# Patient Record
Sex: MALE | Race: WHITE | Employment: OTHER | URBAN - METROPOLITAN AREA
[De-identification: names, ages, dates, MRNs, and addresses within clinical notes are randomized per-mention and may not be internally consistent; named-entity substitution may affect disease eponyms.]

---

## 2020-02-07 ENCOUNTER — HOSPITAL ENCOUNTER (OUTPATIENT)
Dept: CT IMAGING | Age: 65
Discharge: HOME | End: 2020-02-07
Payer: SELF-PAY

## 2020-02-07 DIAGNOSIS — Z00.00 PREVENTATIVE HEALTH CARE: ICD-10-CM

## 2020-02-07 PROCEDURE — 75571 CT HRT W/O DYE W/CA TEST: CPT

## 2020-02-11 NOTE — CARDIO/PULMONARY
Reached patient at his given mobile number. He states his PCP contacted him yesterday about his elevated coronary artery calcium score. Patient states he is already scheduled to follow up with Dr. Destiny Dee. We discussed the meaning of this score. Patient has no further questions at this time.

## 2020-02-17 ENCOUNTER — OFFICE VISIT (OUTPATIENT)
Dept: CARDIOLOGY CLINIC | Age: 65
End: 2020-02-17

## 2020-02-17 VITALS
DIASTOLIC BLOOD PRESSURE: 80 MMHG | RESPIRATION RATE: 19 BRPM | HEIGHT: 68 IN | SYSTOLIC BLOOD PRESSURE: 138 MMHG | OXYGEN SATURATION: 96 % | HEART RATE: 77 BPM | BODY MASS INDEX: 36.31 KG/M2 | WEIGHT: 239.6 LBS

## 2020-02-17 DIAGNOSIS — R93.1 ELEVATED CORONARY ARTERY CALCIUM SCORE: Primary | ICD-10-CM

## 2020-02-17 DIAGNOSIS — Z82.49 FAMILY HISTORY OF EARLY CAD: ICD-10-CM

## 2020-02-17 DIAGNOSIS — E66.01 SEVERE OBESITY (HCC): ICD-10-CM

## 2020-02-17 DIAGNOSIS — I10 BENIGN ESSENTIAL HTN: ICD-10-CM

## 2020-02-17 RX ORDER — ATORVASTATIN CALCIUM 10 MG/1
TABLET, FILM COATED ORAL
COMMUNITY
Start: 2020-02-10 | End: 2020-02-17

## 2020-02-17 RX ORDER — LISINOPRIL 20 MG/1
TABLET ORAL
COMMUNITY
Start: 2020-01-28

## 2020-02-17 RX ORDER — GUAIFENESIN 100 MG/5ML
81 LIQUID (ML) ORAL DAILY
Qty: 90 TAB | Refills: 1 | Status: SHIPPED | OUTPATIENT
Start: 2020-02-17

## 2020-02-17 RX ORDER — ATORVASTATIN CALCIUM 40 MG/1
40 TABLET, FILM COATED ORAL DAILY
Qty: 90 TAB | Refills: 1 | Status: SHIPPED | OUTPATIENT
Start: 2020-02-17 | End: 2020-08-17

## 2020-02-17 NOTE — PROGRESS NOTES
[facility] [address]: [place]  [phone]    History of Present Illness:  Mr. Noble Mcclure is a 74 yo M with essential hypertension, family history of early coronary artery disease, referred by Dr. Concepcion Belle for cardiac evaluation. He is here due to elevated coronary calcium score of 616 (with 416 in the LAD, 80 in the right coronary artery and 120 in the ramus) putting him in the 70th percentile for his age. From a cardiac standpoint, he denies any prior cardiac history, but his younger brother passed a month ago so he ended up getting this screening test.  From a symptom standpoint, he denies any exertional chest pain, shortness of breath, palpitations, lightheadedness or dizziness. He does admit he does not exercise regular, but does do some lifting from time to time. 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 He is compensated on exam with clear lungs and no lower extremity edema. His blood pressure is 138/80 with a heart rate of 77. We reviewed his coronary calcium score results. Soc hx. Retired, construction. Fam hx. Younger brother with CAD/passed. Assessment and Plan:    1. Elevated coronary calcium score. Elevated score and discussed results. He is not active and will proceed with a treadmill nuclear stress test for further evaluation. Agree with statin. With his score, recommended at least moderate intensity statin and will increase him to Lipitor 40 mg once daily. Also, I think the benefits outweigh the risks in terms of taking aspirin 81 mg once a day. If his stress test is normal, he will work on regular exercise and would have him follow back in one year. 2. Essential hypertension. Blood pressure is controlled. 3. Family history of early coronary artery disease. He  has a past medical history of Essential hypertension and Hyperlipidemia. All other systems negative except as above.      PE  Vitals:    02/17/20 1455   BP: 138/80   Pulse: 77 Resp: 19   SpO2: 96%   Weight: 239 lb 9.6 oz (108.7 kg)   Height: 5' 8\" (1.727 m)    Body mass index is 36.43 kg/m². General appearance - alert, well appearing, and in no distress  Mental status - affect appropriate to mood  Eyes - sclera anicteric, moist mucous membranes  Neck - supple, no JVD  Chest - clear to auscultation, no wheezes, rales or rhonchi  Heart - normal rate, regular rhythm, normal S1, S2, no murmurs, rubs, clicks or gallops  Abdomen - soft, nontender, nondistended, no masses or organomegaly  Neurological - no focal deficit  Extremities - peripheral pulses normal, no pedal edema      Recent Labs:  No results found for: CHOL, CHOLX, CHLST, CHOLV, 499747, HDL, HDLP, LDL, LDLC, DLDLP, TGLX, TRIGL, TRIGP, CHHD, CHHDX  No results found for: VAIBHAV, CREAPOC, [facility], CREA, REFC3, REFC4  No results found for: BUN, BUNPOC, IBUN, MBUNV, BUNV  No results found for: K, KI, PLK, WBK  No results found for: HBA1C, HGBE8, MCX2UGTT, TJA6FCZL  No results found for: HGBPOC, HGB, HGBP, HGBEXT, HGBEXT  No results found for: PLT, PLTEXT, PLTEXT    Reviewed:  Past Medical History:   Diagnosis Date    Essential hypertension     Hyperlipidemia      Social History     Tobacco Use   Smoking Status Never Smoker   Smokeless Tobacco Never Used     Social History     Substance and Sexual Activity   Alcohol Use Yes    Comment: casual     Not on File    Current Outpatient Medications   Medication Sig    atorvastatin (LIPITOR) 10 mg tablet     lisinopril (PRINIVIL, ZESTRIL) 20 mg tablet      No current facility-administered medications for this visit.         Laz Duvall MD  [facility] [place]  [facility], [address] [facility] [address]  [address]

## 2020-02-17 NOTE — LETTER
2/18/2020 9:34 AM 
 
Patient:  Aury Smith YOB: 1955 Date of Visit: 2/17/2020 Dear Una Glez, [address] [id number] VIA Facsimile: [phone] 
 : Thank you for referring Mr. Aury Smith to me for evaluation/treatment. Below are the relevant portions of my assessment and plan of care. Mr. Ekta Phillips is a 74 yo M with essential hypertension, family history of early coronary artery disease, referred by Dr. Domingo Selby for cardiac evaluation. He is here due to elevated coronary calcium score of 616 (with 416 in the LAD, 80 in the right coronary artery and 120 in the ramus) putting him in the 70th percentile for his age. From a cardiac standpoint, he denies any prior cardiac history, but his younger brother passed a month ago so he ended up getting this screening test.  From a symptom standpoint, he denies any exertional chest pain, shortness of breath, palpitations, lightheadedness or dizziness. He does admit he does not exercise regular, but does do some lifting from time to time. 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 He is compensated on exam with clear lungs and no lower extremity edema. His blood pressure is 138/80 with a heart rate of 77. We reviewed his coronary calcium score results. Soc hx. Retired, construction. Fam hx. Younger brother with CAD/passed. Assessment and Plan: 1. Elevated coronary calcium score. Elevated score and discussed results. He is not active and will proceed with a treadmill nuclear stress test for further evaluation. Agree with statin. With his score, recommended at least moderate intensity statin and will increase him to Lipitor 40 mg once daily. Also, I think the benefits outweigh the risks in terms of taking aspirin 81 mg once a day. If his stress test is normal, he will work on regular exercise and would have him follow back in one year. 2. Essential hypertension. Blood pressure is controlled. 3. Family history of early coronary artery disease. If you have questions, please do not hesitate to call me. Sincerely, Danika Lazo MD

## 2020-02-17 NOTE — PATIENT INSTRUCTIONS
You will be scheduled for a Nuclear Stress Test after your appointment today. For Nuclear Stress Test: 
Wear comfortable clothing (shorts or pants with a shirt or blouse- no underwire bras) and walking or athletic shoes. DO NOT eat or drink anything, except water, for at least 2 hours prior to your appointment. AVOID tobacco products for at least 6 hours prior to your test. 
 
DO NOT eat or drink anything containing caffeine, including but not limited to the following: chocolate, regular and decaffeinated coffee, soft drinks, or tea for at least 12-24 hours prior to your test. 
 
Do take your scheduled medications prior to your test.  
 
You will need to inform our office if you are pregnant, nursing, or think you may be pregnant. Your test will be performed on a 1 OR 2 day protocol. This is determined by your height, weight, and other risk factors. For a 2 day test, please allow for 2 hours in the office each day. For a 1 day test, please allow for 4 hours in the office that day. The radioactive isotope used for your testing is different from any of the dyes that are commonly used in x-ray procedures, and is ordered specially for your test. Please call to cancel or reschedule your appointment at least 24 hours prior to your scheduled appointment to avoid being billed for the expensive isotope.

## 2020-03-05 ENCOUNTER — TELEPHONE (OUTPATIENT)
Dept: CARDIOLOGY CLINIC | Age: 65
End: 2020-03-05

## 2020-03-05 NOTE — TELEPHONE ENCOUNTER
Returned call to patient. Two patient indentifiers verified. Pt was informed of test results. Pt stated that the leg pain wasn't bad it was just that his legs were sore from the uphill walking. Pt stated he doesn't want to have other test completed. Pt verbalized understanding and denies any further questions.

## 2020-03-05 NOTE — TELEPHONE ENCOUNTER
----- Message from Francisca Gonzalez MD sent at 3/4/2020  5:07 PM EST -----  Please let pt know stress test was normal. Given the leg pain he had with treadmill would obtain ABIs (Claudication) if you can please set up.  thx

## 2020-08-17 RX ORDER — ATORVASTATIN CALCIUM 40 MG/1
TABLET, FILM COATED ORAL
Qty: 90 TAB | Refills: 1 | Status: SHIPPED | OUTPATIENT
Start: 2020-08-17

## 2020-08-17 NOTE — TELEPHONE ENCOUNTER
Requested Prescriptions     Signed Prescriptions Disp Refills    atorvastatin (LIPITOR) 40 mg tablet 90 Tab 1     Sig: TAKE ONE TABLET BY MOUTH DAILY     Authorizing Provider: Sarah Lowery     Ordering User: Lisette Ruiz       Last office visit 2/17/2020    Per Dr. Suzy Melara     No future appointments.

## 2021-02-26 RX ORDER — ATORVASTATIN CALCIUM 40 MG/1
TABLET, FILM COATED ORAL
Qty: 90 TAB | Refills: 0 | OUTPATIENT
Start: 2021-02-26

## 2021-03-01 RX ORDER — ATORVASTATIN CALCIUM 40 MG/1
TABLET, FILM COATED ORAL
Qty: 90 TAB | Refills: 0 | OUTPATIENT
Start: 2021-03-01

## 2022-03-19 PROBLEM — E66.01 SEVERE OBESITY: Status: ACTIVE | Noted: 2020-02-17

## 2024-11-02 ENCOUNTER — OFFICE VISIT (OUTPATIENT)
Age: 69
End: 2024-11-02

## 2024-11-02 VITALS
HEART RATE: 86 BPM | RESPIRATION RATE: 16 BRPM | TEMPERATURE: 98.2 F | SYSTOLIC BLOOD PRESSURE: 130 MMHG | HEIGHT: 68 IN | DIASTOLIC BLOOD PRESSURE: 84 MMHG | BODY MASS INDEX: 34.86 KG/M2 | WEIGHT: 230 LBS | OXYGEN SATURATION: 95 %

## 2024-11-02 DIAGNOSIS — N30.01 ACUTE CYSTITIS WITH HEMATURIA: Primary | ICD-10-CM

## 2024-11-02 LAB
BILIRUBIN, URINE, POC: ABNORMAL
BLOOD URINE, POC: ABNORMAL
GLUCOSE URINE, POC: NEGATIVE
KETONES, URINE, POC: ABNORMAL
LEUKOCYTE ESTERASE, URINE, POC: ABNORMAL
NITRITE, URINE, POC: POSITIVE
PH, URINE, POC: 6.5 (ref 4.6–8)
PROTEIN,URINE, POC: 100
SPECIFIC GRAVITY, URINE, POC: 1.02 (ref 1–1.03)
URINALYSIS CLARITY, POC: ABNORMAL
URINALYSIS COLOR, POC: ABNORMAL
UROBILINOGEN, POC: NORMAL MG/DL

## 2024-11-02 RX ORDER — ATORVASTATIN CALCIUM 20 MG/1
20 TABLET, FILM COATED ORAL DAILY
COMMUNITY

## 2024-11-02 RX ORDER — LISINOPRIL 5 MG/1
5 TABLET ORAL DAILY
COMMUNITY

## 2024-11-02 RX ORDER — CEPHALEXIN 500 MG/1
500 CAPSULE ORAL 2 TIMES DAILY
Qty: 20 CAPSULE | Refills: 0 | Status: SHIPPED | OUTPATIENT
Start: 2024-11-02 | End: 2024-11-12

## 2024-11-02 RX ORDER — TAMSULOSIN HYDROCHLORIDE 0.4 MG/1
0.4 CAPSULE ORAL DAILY
COMMUNITY

## 2024-11-02 ASSESSMENT — ENCOUNTER SYMPTOMS: ABDOMINAL PAIN: 0

## 2024-11-02 NOTE — PATIENT INSTRUCTIONS
Thank you for visiting [facility] today.    -Increase fluid intake.  Some people say cranberry juice helps with discomfort.  -Don't hold your urine.  Urinate when you feel like you need to.  -Wipe from front to back after bowel movements.  -Wear loose fitting clothing.  -Decrease caffeine or carbonated drinks  -Repeat urine screen in two weeks  -Take antibiotic with food and consider probiotic    Follow up with your PCP if symptoms persist or worsen.    Please go to the Emergency Department immediately for symptoms of a urinary tract infection along with any of the following:  fever with severe and sudden shaking (rigors), nausea, vomiting and the inability to keep down clear fluids or medications.

## 2024-11-03 LAB
BACTERIA SPEC CULT: ABNORMAL
BACTERIA SPEC CULT: ABNORMAL
CC UR VC: ABNORMAL
SERVICE CMNT-IMP: ABNORMAL

## 2024-11-06 LAB
BACTERIA SPEC CULT: ABNORMAL
CC UR VC: ABNORMAL
SERVICE CMNT-IMP: ABNORMAL